# Patient Record
Sex: FEMALE | Race: WHITE | NOT HISPANIC OR LATINO
[De-identification: names, ages, dates, MRNs, and addresses within clinical notes are randomized per-mention and may not be internally consistent; named-entity substitution may affect disease eponyms.]

---

## 2018-04-25 ENCOUNTER — APPOINTMENT (OUTPATIENT)
Dept: CT IMAGING | Facility: HOSPITAL | Age: 73
End: 2018-04-25

## 2018-04-25 ENCOUNTER — OUTPATIENT (OUTPATIENT)
Dept: OUTPATIENT SERVICES | Facility: HOSPITAL | Age: 73
LOS: 1 days | End: 2018-04-25
Payer: MEDICARE

## 2018-04-25 DIAGNOSIS — Z00.8 ENCOUNTER FOR OTHER GENERAL EXAMINATION: ICD-10-CM

## 2018-04-25 PROBLEM — Z00.00 ENCOUNTER FOR PREVENTIVE HEALTH EXAMINATION: Status: ACTIVE | Noted: 2018-04-25

## 2018-04-25 PROCEDURE — 71250 CT THORAX DX C-: CPT

## 2018-04-25 PROCEDURE — 71250 CT THORAX DX C-: CPT | Mod: 26

## 2018-04-26 ENCOUNTER — INPATIENT (INPATIENT)
Facility: HOSPITAL | Age: 73
LOS: 1 days | Discharge: HOME HEALTH | End: 2018-04-28
Attending: HOSPITALIST | Admitting: HOSPITALIST
Payer: MEDICARE

## 2018-04-26 VITALS
HEART RATE: 113 BPM | RESPIRATION RATE: 22 BRPM | SYSTOLIC BLOOD PRESSURE: 121 MMHG | TEMPERATURE: 98 F | DIASTOLIC BLOOD PRESSURE: 77 MMHG | OXYGEN SATURATION: 94 %

## 2018-04-26 DIAGNOSIS — J90 PLEURAL EFFUSION, NOT ELSEWHERE CLASSIFIED: ICD-10-CM

## 2018-04-26 LAB
ALBUMIN SERPL ELPH-MCNC: 3.2 G/DL — LOW (ref 3.3–5)
ALP SERPL-CCNC: 160 U/L — HIGH (ref 40–120)
ALT FLD-CCNC: 20 U/L — SIGNIFICANT CHANGE UP (ref 4–33)
APTT BLD: 34.5 SEC — SIGNIFICANT CHANGE UP (ref 27.5–37.4)
AST SERPL-CCNC: 21 U/L — SIGNIFICANT CHANGE UP (ref 4–32)
BASE EXCESS BLDV CALC-SCNC: 8.9 MMOL/L — SIGNIFICANT CHANGE UP
BASOPHILS # BLD AUTO: 0.02 K/UL — SIGNIFICANT CHANGE UP (ref 0–0.2)
BASOPHILS NFR BLD AUTO: 0.4 % — SIGNIFICANT CHANGE UP (ref 0–2)
BILIRUB SERPL-MCNC: 0.7 MG/DL — SIGNIFICANT CHANGE UP (ref 0.2–1.2)
BLOOD GAS VENOUS - CREATININE: 0.46 MG/DL — LOW (ref 0.5–1.3)
BUN SERPL-MCNC: 12 MG/DL — SIGNIFICANT CHANGE UP (ref 7–23)
CALCIUM SERPL-MCNC: 9.4 MG/DL — SIGNIFICANT CHANGE UP (ref 8.4–10.5)
CHLORIDE BLDV-SCNC: 100 MMOL/L — SIGNIFICANT CHANGE UP (ref 96–108)
CHLORIDE SERPL-SCNC: 96 MMOL/L — LOW (ref 98–107)
CO2 SERPL-SCNC: 29 MMOL/L — SIGNIFICANT CHANGE UP (ref 22–31)
CREAT SERPL-MCNC: 0.5 MG/DL — SIGNIFICANT CHANGE UP (ref 0.5–1.3)
EOSINOPHIL # BLD AUTO: 0.03 K/UL — SIGNIFICANT CHANGE UP (ref 0–0.5)
EOSINOPHIL NFR BLD AUTO: 0.5 % — SIGNIFICANT CHANGE UP (ref 0–6)
GAS PNL BLDV: 137 MMOL/L — SIGNIFICANT CHANGE UP (ref 136–146)
GLUCOSE BLDV-MCNC: 134 — HIGH (ref 70–99)
GLUCOSE SERPL-MCNC: 130 MG/DL — HIGH (ref 70–99)
HCO3 BLDV-SCNC: 31 MMOL/L — HIGH (ref 20–27)
HCT VFR BLD CALC: 46.6 % — HIGH (ref 34.5–45)
HCT VFR BLDV CALC: 48.1 % — HIGH (ref 34.5–45)
HGB BLD-MCNC: 15.2 G/DL — SIGNIFICANT CHANGE UP (ref 11.5–15.5)
HGB BLDV-MCNC: 15.7 G/DL — HIGH (ref 11.5–15.5)
IMM GRANULOCYTES # BLD AUTO: 0.01 # — SIGNIFICANT CHANGE UP
IMM GRANULOCYTES NFR BLD AUTO: 0.2 % — SIGNIFICANT CHANGE UP (ref 0–1.5)
INR BLD: 1.06 — SIGNIFICANT CHANGE UP (ref 0.88–1.17)
LACTATE BLDV-MCNC: 2 MMOL/L — SIGNIFICANT CHANGE UP (ref 0.5–2)
LYMPHOCYTES # BLD AUTO: 0.51 K/UL — LOW (ref 1–3.3)
LYMPHOCYTES # BLD AUTO: 9 % — LOW (ref 13–44)
MCHC RBC-ENTMCNC: 31.3 PG — SIGNIFICANT CHANGE UP (ref 27–34)
MCHC RBC-ENTMCNC: 32.6 % — SIGNIFICANT CHANGE UP (ref 32–36)
MCV RBC AUTO: 96.1 FL — SIGNIFICANT CHANGE UP (ref 80–100)
MONOCYTES # BLD AUTO: 0.63 K/UL — SIGNIFICANT CHANGE UP (ref 0–0.9)
MONOCYTES NFR BLD AUTO: 11.1 % — SIGNIFICANT CHANGE UP (ref 2–14)
NEUTROPHILS # BLD AUTO: 4.48 K/UL — SIGNIFICANT CHANGE UP (ref 1.8–7.4)
NEUTROPHILS NFR BLD AUTO: 78.8 % — HIGH (ref 43–77)
NRBC # FLD: 0 — SIGNIFICANT CHANGE UP
PCO2 BLDV: 54 MMHG — HIGH (ref 41–51)
PH BLDV: 7.41 PH — SIGNIFICANT CHANGE UP (ref 7.32–7.43)
PLATELET # BLD AUTO: 217 K/UL — SIGNIFICANT CHANGE UP (ref 150–400)
PMV BLD: 10.3 FL — SIGNIFICANT CHANGE UP (ref 7–13)
PO2 BLDV: 41 MMHG — HIGH (ref 35–40)
POTASSIUM BLDV-SCNC: 3.9 MMOL/L — SIGNIFICANT CHANGE UP (ref 3.4–4.5)
POTASSIUM SERPL-MCNC: 4 MMOL/L — SIGNIFICANT CHANGE UP (ref 3.5–5.3)
POTASSIUM SERPL-SCNC: 4 MMOL/L — SIGNIFICANT CHANGE UP (ref 3.5–5.3)
PROT SERPL-MCNC: 7 G/DL — SIGNIFICANT CHANGE UP (ref 6–8.3)
PROTHROM AB SERPL-ACNC: 12.2 SEC — SIGNIFICANT CHANGE UP (ref 9.8–13.1)
RBC # BLD: 4.85 M/UL — SIGNIFICANT CHANGE UP (ref 3.8–5.2)
RBC # FLD: 12.8 % — SIGNIFICANT CHANGE UP (ref 10.3–14.5)
SAO2 % BLDV: 74 % — SIGNIFICANT CHANGE UP (ref 60–85)
SODIUM SERPL-SCNC: 137 MMOL/L — SIGNIFICANT CHANGE UP (ref 135–145)
TROPONIN T SERPL-MCNC: < 0.06 NG/ML — SIGNIFICANT CHANGE UP (ref 0–0.06)
WBC # BLD: 5.68 K/UL — SIGNIFICANT CHANGE UP (ref 3.8–10.5)
WBC # FLD AUTO: 5.68 K/UL — SIGNIFICANT CHANGE UP (ref 3.8–10.5)

## 2018-04-26 PROCEDURE — 71275 CT ANGIOGRAPHY CHEST: CPT | Mod: 26

## 2018-04-26 NOTE — ED ADULT TRIAGE NOTE - CHIEF COMPLAINT QUOTE
Arrives from [facility] because her chest ct shows pleural effusion, sent for eval . H/O lung CA   [ see papers sent from facility  ] Arrives from [facility] because her chest ct shows pleural effusion, sent for eval . H/O lung CA   [ see papers sent from facility  ] Patient appears comfortable at time of triage.

## 2018-04-26 NOTE — ED ADULT NURSE NOTE - OBJECTIVE STATEMENT
patient alert ox3 came in c/o SOB, had a ct chest done yesterday shows increased fluid in pleural space and sent in for drainage. patient is on oxygen since few days. denies cp. h/o lung cancer with mets. CM shows sinus tach. stage 1 decubiti to sacral area noted, non blanchable redness to sacrum, skin intact. labs done as ordered. awaiting results and re eval.

## 2018-04-26 NOTE — ED PROVIDER NOTE - ATTENDING CONTRIBUTION TO CARE
[place]  pt with advanced lung CA with recurrent pleural effusion  tapped in past  sent in due to reaccumulation   Pt w/o acute complaint  nl RR sat 94 RA decreased sounds both lower fields  card RRR S1S2  abd benign  as plain CT was done as outpt  (no read available)  CTA performed here for further eval  pending at time of signover

## 2018-04-26 NOTE — ED PROVIDER NOTE - OBJECTIVE STATEMENT
71 yo female with PMH of metastatic lung cancer on chemo, recurrent left pulmonary effusion, [facility] effusion seen on CT scan today. Patient 94% on RA, 99% on 2 L nasal cannula. Appears comfortable on and off room air. Patient deconditioned at baseline and endorses baseline PADRON and SOB 2/2 lung cancer but denies CP, palpitations, syncope, pre-syncope, diaphoresis, abdominal pain, back pain, n/v.    Oncologist: [facility] Dr. Ernie Arambulaorrjose

## 2018-04-26 NOTE — ED ADULT NURSE NOTE - CHIEF COMPLAINT QUOTE
Arrives from [facility] because her chest ct shows pleural effusion, sent for eval . H/O lung CA   [ see papers sent from facility  ] Patient appears comfortable at time of triage.

## 2018-04-27 DIAGNOSIS — D86.9 SARCOIDOSIS, UNSPECIFIED: ICD-10-CM

## 2018-04-27 DIAGNOSIS — J90 PLEURAL EFFUSION, NOT ELSEWHERE CLASSIFIED: ICD-10-CM

## 2018-04-27 DIAGNOSIS — C34.92 MALIGNANT NEOPLASM OF UNSPECIFIED PART OF LEFT BRONCHUS OR LUNG: ICD-10-CM

## 2018-04-27 DIAGNOSIS — Z29.9 ENCOUNTER FOR PROPHYLACTIC MEASURES, UNSPECIFIED: ICD-10-CM

## 2018-04-27 PROCEDURE — 12345: CPT | Mod: GC,NC

## 2018-04-27 PROCEDURE — 99223 1ST HOSP IP/OBS HIGH 75: CPT | Mod: GC

## 2018-04-27 RX ORDER — OSIMERTINIB 80 1/1
1 TABLET, FILM COATED ORAL
Qty: 0 | Refills: 0 | COMMUNITY

## 2018-04-27 RX ORDER — ZINC SULFATE TAB 220 MG (50 MG ZINC EQUIVALENT) 220 (50 ZN) MG
220 TAB ORAL DAILY
Qty: 0 | Refills: 0 | Status: DISCONTINUED | OUTPATIENT
Start: 2018-04-27 | End: 2018-04-28

## 2018-04-27 RX ORDER — ZINC OXIDE 200 MG/G
2 OINTMENT TOPICAL
Qty: 0 | Refills: 0 | COMMUNITY

## 2018-04-27 RX ORDER — ACETAMINOPHEN 500 MG
2 TABLET ORAL
Qty: 0 | Refills: 0 | COMMUNITY

## 2018-04-27 RX ORDER — ASCORBIC ACID 60 MG
500 TABLET,CHEWABLE ORAL DAILY
Qty: 0 | Refills: 0 | Status: DISCONTINUED | OUTPATIENT
Start: 2018-04-27 | End: 2018-04-28

## 2018-04-27 RX ORDER — FAMOTIDINE 10 MG/ML
1 INJECTION INTRAVENOUS
Qty: 0 | Refills: 0 | COMMUNITY

## 2018-04-27 RX ORDER — NYSTATIN CREAM 100000 [USP'U]/G
1 CREAM TOPICAL
Qty: 0 | Refills: 0 | COMMUNITY

## 2018-04-27 RX ORDER — FOLIC ACID 0.8 MG
1 TABLET ORAL
Qty: 0 | Refills: 0 | COMMUNITY

## 2018-04-27 RX ORDER — TIOTROPIUM BROMIDE 18 UG/1
1 CAPSULE ORAL; RESPIRATORY (INHALATION) DAILY
Qty: 0 | Refills: 0 | Status: DISCONTINUED | OUTPATIENT
Start: 2018-04-27 | End: 2018-04-28

## 2018-04-27 RX ORDER — FOLIC ACID 0.8 MG
1 TABLET ORAL DAILY
Qty: 0 | Refills: 0 | Status: DISCONTINUED | OUTPATIENT
Start: 2018-04-27 | End: 2018-04-28

## 2018-04-27 RX ORDER — NYSTATIN 500MM UNIT
2 POWDER (EA) MISCELLANEOUS
Qty: 0 | Refills: 0 | COMMUNITY

## 2018-04-27 RX ORDER — PREGABALIN 225 MG/1
100 CAPSULE ORAL DAILY
Qty: 0 | Refills: 0 | Status: DISCONTINUED | OUTPATIENT
Start: 2018-04-27 | End: 2018-04-28

## 2018-04-27 RX ORDER — UMECLIDINIUM 62.5 UG/1
1 AEROSOL, POWDER ORAL
Qty: 0 | Refills: 0 | COMMUNITY

## 2018-04-27 RX ORDER — FAMOTIDINE 10 MG/ML
20 INJECTION INTRAVENOUS DAILY
Qty: 0 | Refills: 0 | Status: DISCONTINUED | OUTPATIENT
Start: 2018-04-27 | End: 2018-04-28

## 2018-04-27 RX ORDER — PREGABALIN 225 MG/1
1 CAPSULE ORAL
Qty: 0 | Refills: 0 | COMMUNITY

## 2018-04-27 RX ORDER — ZINC SULFATE TAB 220 MG (50 MG ZINC EQUIVALENT) 220 (50 ZN) MG
1 TAB ORAL
Qty: 0 | Refills: 0 | COMMUNITY

## 2018-04-27 RX ORDER — FUROSEMIDE 40 MG
20 TABLET ORAL DAILY
Qty: 0 | Refills: 0 | Status: DISCONTINUED | OUTPATIENT
Start: 2018-04-27 | End: 2018-04-28

## 2018-04-27 RX ORDER — ASCORBIC ACID 60 MG
1 TABLET,CHEWABLE ORAL
Qty: 0 | Refills: 0 | COMMUNITY

## 2018-04-27 RX ADMIN — FAMOTIDINE 20 MILLIGRAM(S): 10 INJECTION INTRAVENOUS at 17:46

## 2018-04-27 RX ADMIN — PREGABALIN 100 MICROGRAM(S): 225 CAPSULE ORAL at 17:48

## 2018-04-27 RX ADMIN — Medication 500 MILLIGRAM(S): at 17:46

## 2018-04-27 RX ADMIN — Medication 20 MILLIGRAM(S): at 17:46

## 2018-04-27 RX ADMIN — Medication 1 TABLET(S): at 17:46

## 2018-04-27 RX ADMIN — Medication 10 MILLIGRAM(S): at 17:46

## 2018-04-27 RX ADMIN — Medication 1 APPLICATION(S): at 17:38

## 2018-04-27 RX ADMIN — Medication 1 MILLIGRAM(S): at 17:46

## 2018-04-27 NOTE — CONSULT NOTE ADULT - ASSESSMENT
72 F with extensive stage SCLC (dx 2014, currently on tagrisso), sarcoidosis (she is unsure how she was diagnosed, but is on steroids for it, she thinks), h/o left pleural effusion s/p thoracentesis at [facility] in March, sent from [facility] for "a large right pleural effusion," found to have a small right pleural effusion    1) pleural effusion  - small effusion on bedside lung ultrasound on the right, very small on left; she is asymptomatic from it, so there is no need to drain this at this time  - pt should follow up with her oncologist at [place] re: these CT scan results to compare this scan with prior scans, and with regards to further treatment options    d/w primary team      --    Alex Fish MD  Pulmonary & Critical Care Medicine Fellow | PGY-6  [phone]

## 2018-04-27 NOTE — PROGRESS NOTE ADULT - SUBJECTIVE AND OBJECTIVE BOX
CHUNG Team 1 Acceptance Note- PGY1.    Contact Info:  Seamus Key M.D., PGY-1  Pager: NS- [phone], CHUNG- [id number]      HPI:  Patient is a 72 year-old female with PMH of metastatic small cell lung cancer (dx 2014,  on tagrisso), recurrent left pulmonary effusion referred from [facility] for worsening SOB. Outpatient CT scan showed a large L pleural effusion. Patient states she has had recurrent pleural effusion on R. with a prior catheter which has now been removed. Patient is deconditioned at baseline and endorses baseline PADRON and SOB. On presentation, patient saturating 94% on RA and 100% on 2L NC. She denies chest pain, palpitations, syncope, pre-syncope, diaphoresis, abdominal pain, back pain, n/v, sick contact, recent travel.    ED course: T 98.2, HR 97, 105/71, RR 18, SPO2=97% on 2L NC. (2018 06:12)      Seen and examine patient at bedside. Patient reports feeling . Denied fever, chill, n/v/d, CP, SOB, or abdominal pain.       Allergies    No Known Allergies    Intolerances    	    PAST MEDICAL & SURGICAL HISTORY:  Sarcoidosis  Lung cancer  No significant past surgical history      FAMILY HISTORY:  Family history of thyroid cancer (Mother)  Family history of muscular dystrophy (Father)  Family history of Kaposi's sarcoma (Sibling)      SOCIAL HISTORY:  Tobacco: denies  EtOH: denies  Illicit drugs: denies  Lives with    MEDICATIONS:  furosemide    Tablet 20 milliGRAM(s) Oral daily      benzonatate 100 milliGRAM(s) Oral three times a day PRN  tiotropium 18 MICROgram(s) Capsule 1 Capsule(s) Inhalation daily      famotidine    Tablet 20 milliGRAM(s) Oral daily    predniSONE   Tablet 10 milliGRAM(s) Oral daily    ascorbic acid 500 milliGRAM(s) Oral daily  clotrimazole 1% Cream 1 Application(s) Topical two times a day  cyanocobalamin 100 MICROGram(s) Oral daily  folic acid 1 milliGRAM(s) Oral daily  multivitamin 1 Tablet(s) Oral daily  zinc sulfate 220 milliGRAM(s) Oral daily      PHYSICAL EXAM:  T(C): 36.8 (18 @ 05:01), Max: 37.7 (18 @ 17:15)  HR: 97 (18 @ 05:01) (96 - 113)  BP: 105/71 (18 @ 05:01) (105/71 - 131/89)  RR: 18 (18 @ 05:01) (18 - 24)  SpO2: 97% (18 @ 05:01) (94% - 97%)  Wt(kg): --  Daily Height in cm: 162.56 (2018 01:33)    Daily Weight in k.4 (2018 05:01)  I&O's Summary      TELEMETRY:     Daily Height in cm: 162.56 (2018 01:33)    Daily Weight in k.4 (2018 05:01)   Appearance: NAD, well-developed	  HEENT:   Normal oral mucosa, PERRL, EOMI	  Neck: No JVD, no LAD, supple, trachea in midline  Cardiovascular: Normal S1 S2, No JVD, No murmurs  Respiratory: Lungs clear to auscultation, no wheezing, rhonchi  Gastrointestinal:  Soft, Non-tender, nondistended, + BS  Skin: No rashes, No ecchymoses, No cyanosis	  MSK/Extremities: Normal range of motion, 5/5 Muscle strength bilaterally. No clubbing, cyanosis or edema  Vascular: Peripheral pulses palpable 2+ bilaterally  Neurologic: Non-focal, CN II-XII intact  Psychiatry: A & O x 3, Mood & affect appropriate    LABS:	 	                        15.2   5.68  )-----------( 217      ( 2018 17:24 )             46.6         137  |  96<L>  |  12  ----------------------------<  130<H>  4.0   |  29  |  0.50    Ca    9.4      2018 17:24    TPro  7.0  /  Alb  3.2<L>  /  TBili  0.7  /  DBili  x   /  AST  21  /  ALT  20  /  AlkPhos  160<H>        proBNP:   Lipid Profile:   HgA1c:   TSH:   FS: CAPILLARY BLOOD GLUCOSE        BCX/UCX:     CARDIAC MARKERS:       COAGS:  INR: 1.06 (18 @ 17:24)    	    ECG:  	  RADIOLOGY:    Imaging Personally Reviewed:  [ ] YES  [ ] NO  Consultant(s) Notes Reviewed:  [X] YES  [ ] NO  Care Discussed with Consultants/Other Providers [ ] YES  [ ] NO CHUNG Team 1 Acceptance Note- PGY1.    Contact Info:  Seamus Key M.D., PGY-1  Pager: NS- [phone], CHUNG- [id number]      HPI:  Patient is a 72 year-old female with PMH of metastatic small cell lung cancer (dx ,  on tagrisso), recurrent left pulmonary effusion referred from [facility] for worsening SOB. Outpatient CT scan showed a large L pleural effusion. Patient states she has had recurrent pleural effusion on R. with a prior catheter which has now been removed. Patient is deconditioned at baseline and endorses baseline PADRON and SOB. On presentation, patient saturating 94% on RA and 100% on 2L NC. She denies chest pain, palpitations, syncope, pre-syncope, diaphoresis, abdominal pain, back pain, n/v, sick contact, recent travel.  ED course: T 98.2, HR 97, 105/71, RR 18, SPO2=97% on 2L NC. (2018 06:12)    Seen and examine patient at bedside. Patient reports feeling good. Denied fever, chill, CP, SOB, abdominal pain, or diarrhea.       Allergies    No Known Allergies    Intolerances    	    PAST MEDICAL & SURGICAL HISTORY:  Sarcoidosis  Lung cancer  No significant past surgical history      FAMILY HISTORY:  Family history of thyroid cancer (Mother)  Family history of muscular dystrophy (Father)  Family history of Kaposi's sarcoma (Sibling)      SOCIAL HISTORY:  Tobacco: denies  EtOH: denies  Illicit drugs: denies  Lives with    MEDICATIONS:  furosemide    Tablet 20 milliGRAM(s) Oral daily      benzonatate 100 milliGRAM(s) Oral three times a day PRN  tiotropium 18 MICROgram(s) Capsule 1 Capsule(s) Inhalation daily      famotidine    Tablet 20 milliGRAM(s) Oral daily    predniSONE   Tablet 10 milliGRAM(s) Oral daily    ascorbic acid 500 milliGRAM(s) Oral daily  clotrimazole 1% Cream 1 Application(s) Topical two times a day  cyanocobalamin 100 MICROGram(s) Oral daily  folic acid 1 milliGRAM(s) Oral daily  multivitamin 1 Tablet(s) Oral daily  zinc sulfate 220 milliGRAM(s) Oral daily      PHYSICAL EXAM:  T(C): 36.8 (18 @ 05:01), Max: 37.7 (18 @ 17:15)  HR: 97 (18 @ 05:01) (96 - 113)  BP: 105/71 (18 @ 05:01) (105/71 - 131/89)  RR: 18 (18 @ 05:01) (18 - 24)  SpO2: 97% (18 @ 05:01) (94% - 97%)  Wt(kg): --  Daily Height in cm: 162.56 (2018 01:33)    Daily Weight in k.4 (2018 05:01)  I&O's Summary      TELEMETRY:     Daily Height in cm: 162.56 (2018 01:33)    Daily Weight in k.4 (2018 05:01)   General: WN/WD NAD  Neurology: A&Ox3, nonfocal, WHITE x 4  Head:  Normocephalic, atraumatic  ENT:  Mucosa moist, no ulcerations  Neck:  Supple, no sinuses or palpable masses  Lymphatic:  No palpable cervical, supraclavicular, axillary or inguinal adenopathy  Respiratory: decreased BS R>L  CV: RRR, S1S2, no murmur  Abdominal: Soft, NT, ND no palpable mass  MSK: No edema, + peripheral pulses, FROM all 4 extremity    LABS:	 	                        15.2   5.68  )-----------( 217      ( 2018 17:24 )             46.6         137  |  96<L>  |  12  ----------------------------<  130<H>  4.0   |  29  |  0.50    Ca    9.4      2018 17:24    TPro  7.0  /  Alb  3.2<L>  /  TBili  0.7  /  DBili  x   /  AST  21  /  ALT  20  /  AlkPhos  160<H>        proBNP:   Lipid Profile:   HgA1c:   TSH:   FS: CAPILLARY BLOOD GLUCOSE      RADIOLOGY:  < from: CT Angio Chest w/ IV Cont (18 @ 20:45) >  IMPRESSION:     Suboptimal contrast opacification of the subsegmental pulmonary arteries.   No obvious embolus to the level of the segmentalpulmonary arteries.     Narrowing of the left-sided bronchi and left-sided segmental pulmonary   arteries with left hilar/basilar opacity, which may be related to known   neoplasm. Recommend comparison to previous outside study for further   evaluation.    Small to moderate right and small left pleural effusion with compressive   atelectasis. Recommend clinical correlation to assess underlying   pneumonia.    Indeterminate hypodense lesion along the left chest wall.   Neoplasm/metastasis is not excluded. Comparison to previous outside study   would be helpful.    Coarse calcifications in the right > left breast soft tissue. Recommend   correlation with dedicated breast imaging.    Osseous metastasis. Age-indeterminate endplate depression in the spine as   described. Comparison to previous outside study is recommended to assess   stability/chronicity.     Additional findings as described.    < end of copied text >        Imaging Personally Reviewed:  [ ] YES  [ ] NO  Consultant(s) Notes Reviewed:  [X] YES  [ ] NO  Care Discussed with Consultants/Other Providers [ ] YES  [ ] NO

## 2018-04-27 NOTE — DISCHARGE NOTE ADULT - CARE PLAN
Principal Discharge DX:	Pleural effusion  Goal:	Resolution  Assessment and plan of treatment:	You came in with imaging finding of fluid collection in your lungs. This is caused by your lung cancer. You were seen by pulmonologist and recommended no drainage due to insufficient fluid to be drained. Please follow up with Dr. Estrella for further treatment options  Secondary Diagnosis:	Sarcoidosis  Goal:	Continue treatment, follow up with Dr. Estrella  Assessment and plan of treatment:	Please continue to take prednisone at home. Please follow up with Dr. Estrella in 1-2 weeks  Secondary Diagnosis:	Lung cancer  Goal:	follow up with Dr. Estrella  Assessment and plan of treatment:	Please follow up with Dr. Estrella in 1-2 weeks after discharge from the hospital. Principal Discharge DX:	Pleural effusion  Goal:	Resolution  Assessment and plan of treatment:	You came in with imaging finding of fluid collection in your lungs. This is caused by your lung cancer. You were seen by pulmonologist and recommended no drainage due to insufficient fluid to be drained. Please continue to take furosemide 20mg daily. Please follow up with Dr. Estrella for further treatment options  Secondary Diagnosis:	Sarcoidosis  Goal:	Continue treatment, follow up with Dr. Estrella  Assessment and plan of treatment:	Please continue to take prednisone at home. Please follow up with Dr. Estrella in 1-2 weeks  Secondary Diagnosis:	Lung cancer  Goal:	follow up with Dr. Estrella  Assessment and plan of treatment:	Please continue to take tagrisso at home. Please follow up with Dr. Estrella in 1-2 weeks after discharge from the hospital.

## 2018-04-27 NOTE — DISCHARGE NOTE ADULT - DURABLE MEDICAL EQUIPMENT AGENCY
Rolling walker ordered through [facility] [place] [phone], for delivery to hospital bedside, awaiting insurance authorization. Patient declined need for Rolling walker.

## 2018-04-27 NOTE — PROGRESS NOTE ADULT - PROBLEM SELECTOR PLAN 2
-Oncology consult to restart tigrasso  -Monitor calcium and electrolytes  - c/w inhaler therapy (change to tiotropium as incruse not on formulary), lasix, MVI supplementation

## 2018-04-27 NOTE — H&P ADULT - NSHPPHYSICALEXAM_GEN_ALL_CORE
Vital Signs Last 24 Hrs  T(C): 36.8 (27 Apr 2018 05:01), Max: 37.7 (26 Apr 2018 17:15)  T(F): 98.2 (27 Apr 2018 05:01), Max: 99.9 (26 Apr 2018 17:15)  HR: 97 (27 Apr 2018 05:01) (96 - 113)  BP: 105/71 (27 Apr 2018 05:01) (105/71 - 131/89)  BP(mean): --  RR: 18 (27 Apr 2018 05:01) (18 - 24)  SpO2: 97% (27 Apr 2018 05:01) (94% - 97%)    General: WN/WD NAD  Neurology: A&Ox3, nonfocal, WHITE x 4  Head:  Normocephalic, atraumatic  ENT:  Mucosa moist, no ulcerations  Neck:  Supple, no sinuses or palpable masses  Lymphatic:  No palpable cervical, supraclavicular, axillary or inguinal adenopathy  Respiratory: decreased BS R>L, moving care  CV: RRR, S1S2, no murmur  Abdominal: Soft, NT, ND no palpable mass  MSK: No edema, + peripheral pulses, FROM all 4 extremity Vital Signs Last 24 Hrs  T(C): 36.8 (27 Apr 2018 05:01), Max: 37.7 (26 Apr 2018 17:15)  T(F): 98.2 (27 Apr 2018 05:01), Max: 99.9 (26 Apr 2018 17:15)  HR: 97 (27 Apr 2018 05:01) (96 - 113)  BP: 105/71 (27 Apr 2018 05:01) (105/71 - 131/89)  BP(mean): --  RR: 18 (27 Apr 2018 05:01) (18 - 24)  SpO2: 97% (27 Apr 2018 05:01) (94% - 97%)    General: WN/WD NAD  Neurology: A&Ox3, nonfocal, WHITE x 4  Head:  Normocephalic, atraumatic  ENT:  Mucosa moist, no ulcerations  Neck:  Supple, no sinuses or palpable masses  Lymphatic:  No palpable cervical, supraclavicular, axillary or inguinal adenopathy  Respiratory: decreased BS R>L  CV: RRR, S1S2, no murmur  Abdominal: Soft, NT, ND no palpable mass  MSK: No edema, + peripheral pulses, FROM all 4 extremity

## 2018-04-27 NOTE — PHYSICAL THERAPY INITIAL EVALUATION ADULT - PERTINENT HX OF CURRENT PROBLEM, REHAB EVAL
Patient is a 72 year old female admitted to [facility] on 4/[facility] for worsening SOB. PMH includes: metastatic small cell lung cancer, recurrent left pulmonary effusion.

## 2018-04-27 NOTE — H&P ADULT - ATTENDING COMMENTS
Patient seen and examined on 4/27/18, case discussed with Dr. Cade, agree with assessment and plan as above.

## 2018-04-27 NOTE — PROGRESS NOTE ADULT - ASSESSMENT
Patient is a 72 year-old female with PMH of metastatic small cell lung cancer (dx 2014,  on tagrisso), hx of recurrent pleural effusion referred from [facility] for imaging finding of right pleural effusion.

## 2018-04-27 NOTE — H&P ADULT - HISTORY OF PRESENT ILLNESS
Patient is a 72 year-old female with PMH of metastatic small cell lung cancer (dx 2014,  on tagrisso), recurrent left pulmonary effusion referred from [facility] for worsening SOB. Outpatient CT scan showed a large L pleural effusion. Patient states she has had recurrent pleural effusion on R. with a prior catheter which has now been removed. Patient is deconditioned at baseline and endorses baseline PADRON and SOB. On presentation, patient saturating 94% on RA and 100% on 2L NC. She denies chest pain, palpitations, syncope, pre-syncope, diaphoresis, abdominal pain, back pain, n/v, sick contact, recent travel.    ED course: T 98.2, HR 97, 105/71, RR 18, SPO2=97% on 2L NC.

## 2018-04-27 NOTE — H&P ADULT - PROBLEM SELECTOR PLAN 2
-Oncology follow up.   -Will need to resume tigrasso -Oncology consult.   -Will need to resume tigrasso  -Monitor calcium and electrolytes -Oncology consult.   -Will need to resume tigrasso  -Monitor calcium and electrolytes  - c/w inhaler therapy (change to tiotropium as incruse not on formulary), lasix, MVI supplementation

## 2018-04-27 NOTE — H&P ADULT - PROBLEM SELECTOR PLAN 1
-Small to moderate right >small left pleural effusion. Indeterminate hypodense lesion along the left chest wall. Neoplasm/metastasis is not excluded.   would be helpful  -Pulmonology consult in AM for thoracentesis vs pleurex catheter placement  -Obtain outpatient records from [facility] -Small to moderate right >small left pleural effusion. Indeterminate hypodense lesion along the left chest wall. Neoplasm/metastasis is not excluded.   would be helpful  -Pulmonology consult in AM for pleurex catheter placement  -Obtain outpatient records from [facility] -Small to moderate right >small left pleural effusion. Indeterminate hypodense lesion along the left chest wall. Neoplasm/metastasis is not excluded.   would be helpful  - Patient was recently hospitalized as [facility] in [place] for similar finding and had a pleural tap which revealed her to have a malignant R sided effusion, therefore suspect her current effusion is likely a reaccumulation of her malignant effusion  -Pulmonology consult in AM for possible pleurex catheter placement  -Obtain outpatient records from [facility]

## 2018-04-27 NOTE — DISCHARGE NOTE ADULT - PROVIDER TOKENS
FREE:[LAST:[Delores],FIRST:[Ernie],PHONE:[phone]],FAX:[(   [phone]    -],ADDRESS:[[address] [place] [address],   [place] [address]]]

## 2018-04-27 NOTE — DISCHARGE NOTE ADULT - PLAN OF CARE
Continue treatment, follow up with Dr. Estrella Please continue to take prednisone at home. Please follow up with Dr. Estrella in 1-2 weeks follow up with Dr. Estrella Please follow up with Dr. Estrella in 1-2 weeks after discharge from the hospital. Resolution You came in with imaging finding of fluid collection in your lungs. This is caused by your lung cancer. You were seen by pulmonologist and recommended no drainage due to insufficient fluid to be drained. Please follow up with Dr. Estrella for further treatment options You came in with imaging finding of fluid collection in your lungs. This is caused by your lung cancer. You were seen by pulmonologist and recommended no drainage due to insufficient fluid to be drained. Please continue to take furosemide 20mg daily. Please follow up with Dr. Estrella for further treatment options Please continue to take tagrisso at home. Please follow up with Dr. Estrella in 1-2 weeks after discharge from the hospital.

## 2018-04-27 NOTE — DISCHARGE NOTE ADULT - HOME CARE AGENCY
[facility] [place]. [phone]. Nurse to visit on day after discharge. Nurse to call prior to visit to arrange visit. Physical Therapy to follow.

## 2018-04-27 NOTE — DISCHARGE NOTE ADULT - MEDICATION SUMMARY - MEDICATIONS TO TAKE
I will START or STAY ON the medications listed below when I get home from the hospital:    predniSONE 10 mg oral tablet  -- 1 tab(s) by mouth once a day  -- Indication: For Sarcoidosis    Tylenol 325 mg oral tablet  -- 2 tab(s) by mouth every 4 hours, As Needed  -- Indication: For Pain    nystatin 100,000 units/g topical cream (obsolete)  -- Apply on skin to affected area 2 times a day  -- Indication: For rash    Tessalon 200 mg oral capsule  -- 1 cap(s) by mouth 3 times a day  -- Indication: For cough    Incruse Ellipta 62.5 mcg/inh inhalation powder  -- 1 puff(s) inhaled once a day  -- Indication: For Sarcoidosis    nystatin 100,000 units/g topical powder  -- Apply on skin to affected area 2 times a day to b/l feet  -- Indication: For rash    Triple Paste topical ointment  -- Apply on skin to affected area once a day  -- Indication: For rash    Tagrisso 80 mg oral tablet  -- 1 tab(s) by mouth once a day  -- Indication: For Malignant neoplasm of left lung, unspecified part of lung    famotidine 20 mg oral tablet  -- 1 tab(s) by mouth once a day  -- Indication: For GERD    zinc sulfate 220 mg oral tablet  -- 1 tab(s) by mouth once a day  -- Indication: For Nutrition supplement    Multiple Vitamins oral capsule  -- 1 cap(s) by mouth once a day  -- Indication: For Nutrition supplement    cyanocobalamin 100 mcg oral tablet  -- 1 tab(s) by mouth once a day  -- Indication: For Nutrition supplement    folic acid 1 mg oral tablet  -- 1 tab(s) by mouth once a day  -- Indication: For Nutrition supplement    Vitamin C 500 mg oral tablet  -- 1 tab(s) by mouth once a day  -- Indication: For Nutrition supplement I will START or STAY ON the medications listed below when I get home from the hospital:    predniSONE 10 mg oral tablet  -- 1 tab(s) by mouth once a day  -- Indication: For Sarcoidosis    Tylenol 325 mg oral tablet  -- 2 tab(s) by mouth every 4 hours, As Needed  -- Indication: For Pain    nystatin 100,000 units/g topical cream (obsolete)  -- Apply on skin to affected area 2 times a day  -- Indication: For rash    Tessalon 200 mg oral capsule  -- 1 cap(s) by mouth 3 times a day  -- Indication: For cough    Incruse Ellipta 62.5 mcg/inh inhalation powder  -- 1 puff(s) inhaled once a day  -- Indication: For Sarcoidosis    nystatin 100,000 units/g topical powder  -- Apply on skin to affected area 2 times a day to b/l feet  -- Indication: For rash    Triple Paste topical ointment  -- Apply on skin to affected area once a day  -- Indication: For rash    furosemide 20 mg oral tablet  -- 1 tab(s) by mouth once a day  -- Indication: For Pleural effusion    Tagrisso 80 mg oral tablet  -- 1 tab(s) by mouth once a day  -- Indication: For Malignant neoplasm of left lung, unspecified part of lung    famotidine 20 mg oral tablet  -- 1 tab(s) by mouth once a day  -- Indication: For GERD    zinc sulfate 220 mg oral tablet  -- 1 tab(s) by mouth once a day  -- Indication: For Nutrition supplement    Multiple Vitamins oral capsule  -- 1 cap(s) by mouth once a day  -- Indication: For Nutrition supplement    cyanocobalamin 100 mcg oral tablet  -- 1 tab(s) by mouth once a day  -- Indication: For Nutrition supplement    folic acid 1 mg oral tablet  -- 1 tab(s) by mouth once a day  -- Indication: For Nutrition supplement    Vitamin C 500 mg oral tablet  -- 1 tab(s) by mouth once a day  -- Indication: For Nutrition supplement

## 2018-04-27 NOTE — H&P ADULT - PROBLEM SELECTOR PLAN 4
Holding pharmacologic DVT PPX before possible procedure, pnnematic compressions Holding pharmacologic DVT PPX before possible procedure, pneumatic compressions

## 2018-04-27 NOTE — H&P ADULT - NSHPLABSRESULTS_GEN_ALL_CORE
15.2   5.68  )-----------( 217      ( 26 Apr 2018 17:24 )             46.6       04-26    137  |  96<L>  |  12  ----------------------------<  130<H>  4.0   |  29  |  0.50    Ca    9.4      26 Apr 2018 17:24    TPro  7.0  /  Alb  3.2<L>  /  TBili  0.7  /  DBili  x   /  AST  21  /  ALT  20  /  AlkPhos  160<H>  04-26                  PT/INR - ( 26 Apr 2018 17:24 )   PT: 12.2 SEC;   INR: 1.06          PTT - ( 26 Apr 2018 17:24 )  PTT:34.5 SEC    Lactate Trend      CARDIAC MARKERS ( 26 Apr 2018 17:24 )  x     / < 0.06 ng/mL / x     / x     / x            CAPILLARY BLOOD GLUCOSE LABS and ADDITIONAL STUDIES:                15.2   5.68  )-----------( 217      ( 26 Apr 2018 17:24 )              46.6       04-26    137  |  96<L>  |  12  ----------------------------<  130<H>  4.0   |  29  |  0.50    Ca    9.4      26 Apr 2018 17:24    TPro  7.0  /  Alb  3.2<L>  /  TBili  0.7  /  DBili  x   /  AST  21  /  ALT  20  /  AlkPhos  160<H>  04-26        PT/INR - ( 26 Apr 2018 17:24 )   PT: 12.2 SEC;   INR: 1.06     PTT - ( 26 Apr 2018 17:24 )  PTT:34.5 SEC    CARDIAC MARKERS ( 26 Apr 2018 17:24 )  x     / < 0.06 ng/mL / x     / x     / x          < from: CT Angio Chest w/ IV Cont (04.26.18 @ 20:45) >    LUNGS AND LARGE AIRWAYS: Patent central airways. Compressive atelectasis. Narrowing of the left upper lobe, left lower lobe and lingula bronchi with left hilar/basilar opacity. Calcified granuloma in the right lower lobe.  PLEURA: No pneumothorax. Small to moderate right and small left pleural effusion. Hyperdensity along the left pleura, which may be due to prior pleurodesis.  VESSELS: Suboptimal contrast opacification of the subsegmental pulmonary arteries. No obvious embolus to the level of the segmental pulmonary arteries. Atherosclerotic change of the thoracic aorta, great vessel origin and coronary arteries. Somewhat narrowed segmental pulmonary arteries of the left upper lobe, left lower lobe and lingula.  HEART: Normal heart size. No pericardial effusion. Aortic valve calcification.  MEDIASTINUM AND BHUPINDER: Subcentimeter mediastinal and right hilar lymph nodes.  CHEST WALL AND LOWER NECK: Coarse calcifications in the right > left breast soft tissue. A 2.3 x 2.1 cm hyperdense lesion along the left chest wall soft tissue at the level of the left eighth and ninth lateral ribs.  VISUALIZED UPPER ABDOMEN: Small hiatal hernia. Nonspecific bilateral perinephric stranding.  BONES: Multiple sclerotic lesions scattered throughout the visualized bones, indicating metastasis. Rightward curvature and degenerative changes of the spine. Age-indeterminate endplate depression in the spine, notable at T2, T3, T5 and T10-T12. Age-indeterminate contour deformities of the left second and fourth anterior ribs and right third through fifth anterior ribs, which may be chronic.    IMPRESSION:     Suboptimal contrast opacification of the subsegmental pulmonary arteries. No obvious embolus to the level of the segmental pulmonary arteries.     Narrowing of the left-sided bronchi and left-sided segmental pulmonary arteries with left hilar/basilar opacity, which may be related to known neoplasm. Recommend comparison to previous outside study for further evaluation.    Small to moderate right and small left pleural effusion with compressive atelectasis. Recommend clinical correlation to assess underlying pneumonia.    Indeterminate hypodense lesion along the left chest wall. Neoplasm/metastasis is not excluded. Comparison to previous outside study would be helpful.    Coarse calcifications in the right > left breast soft tissue. Recommend correlation with dedicated breast imaging.    Osseous metastasis. Age-indeterminate endplate depression in the spine as described. Comparison to previous outside study is recommended to assess stability/chronicity.     Additional findings as described.    < end of copied text >

## 2018-04-27 NOTE — H&P ADULT - ASSESSMENT
Patient is a 72 year-old female with PMH of metastatic small cell lung cancer (dx 2014,  on tagrisso), recurrent left pulmonary effusion referred from [facility] rehabilitation for worsening SOB.

## 2018-04-27 NOTE — CONSULT NOTE ADULT - ATTENDING COMMENTS
I have seen and examined the patient. I agree with the above history, physical exam, and plan of care except for as detailed below.    73 y/o F w/metastatic SCLC on chemo, sarcoidosis on prednisone, sent from rehab for R pleural effusion found on imaging? Patient cannot give clear reasoning for hospitalization. Effusion is likely malignant. Patient is currently asymptomatic from pleural effusion, not hypoxemic and is afebrile without concern for infected pleural space. On bedside ultrasound R sided effusion is small.     - No indication for thoracentesis/pleurX placement  - Continue lasix  - Continue prednisone   - Outpatient oncology follow up

## 2018-04-27 NOTE — PHYSICAL THERAPY INITIAL EVALUATION ADULT - ADDITIONAL COMMENTS
Patient reports she lives alone in a private house, 1 step to enter. Patient reports 1 flight within the house, but reports she can stay on the main level upon returning home. Patient reports she was previously independent in all ADLs and did not use an assistive device for ambulation.    Patient was left semi-supine in bed as found, all lines/tubes intact and call weller within reach, SHABANA burrows

## 2018-04-27 NOTE — CHART NOTE - NSCHARTNOTEFT_GEN_A_CORE
Oncology Note     Her oncologist is at [facility] [place], Dr Cali. We will differ the decision of restarting tagrisso to her primary oncologist.     CAse d/w Dr Cavanaugh, patient and primary team     Angie Sevilla   Oncology fellow

## 2018-04-27 NOTE — H&P ADULT - FAMILY HISTORY
Sibling  Still living? Unknown  Family history of Kaposi's sarcoma, Age at diagnosis: Age Unknown     Father  Still living? Unknown  Family history of muscular dystrophy, Age at diagnosis: Age Unknown     Mother  Still living? Unknown  Family history of thyroid cancer, Age at diagnosis: Age Unknown

## 2018-04-27 NOTE — DISCHARGE NOTE ADULT - HOSPITAL COURSE
History of Present Illness: 	  Patient is a 72 year-old female with PMH of metastatic small cell lung cancer (dx 2014,  on tagrisso), recurrent left pulmonary effusion referred from [facility] for worsening SOB. Outpatient CT scan showed a large L pleural effusion. Patient states she has had recurrent pleural effusion on R. with a prior catheter which has now been removed. Patient is deconditioned at baseline and endorses baseline PADRON and SOB. On presentation, patient saturating 94% on RA and 100% on 2L NC. She denies chest pain, palpitations, syncope, pre-syncope, diaphoresis, abdominal pain, back pain, n/v, sick contact, recent travel.  ED course: T 98.2, HR 97, 105/71, RR 18, SPO2=97% on 2L NC.     Hospital Course:  Patient was seen by pulmonologist and no indication for thoracentesis or pleurx as patient is asymptomatic and the effusion is small. Patient was seen by the physical therapy which recommended. Patient is deemed medically stable to be dsicharged, History of Present Illness: 	  Patient is a 72 year-old female with PMH of metastatic small cell lung cancer (dx 2014,  on tagrisso), recurrent left pulmonary effusion referred from [facility] for worsening SOB. Outpatient CT scan showed a large L pleural effusion. Patient states she has had recurrent pleural effusion on R. with a prior catheter which has now been removed. Patient is deconditioned at baseline and endorses baseline PADRON and SOB. On presentation, patient saturating 94% on RA and 100% on 2L NC. She denies chest pain, palpitations, syncope, pre-syncope, diaphoresis, abdominal pain, back pain, n/v, sick contact, recent travel.  ED course: T 98.2, HR 97, 105/71, RR 18, SPO2=97% on 2L NC.     Hospital Course:  Patient was seen by pulmonologist and no indication for thoracentesis or pleurx as patient is asymptomatic and the effusion is small. Patient was seen by the physical therapy which recommended home with home PT. Patient is deemed medically stable to be discharged History of Present Illness: 	  Patient is a 72 year-old female with PMH of metastatic small cell lung cancer (dx 2014,  on tagrisso), recurrent left pulmonary effusion referred from [facility] for worsening SOB. Outpatient CT scan showed a large L pleural effusion. Patient states she has had recurrent pleural effusion on R. with a prior catheter which has now been removed. Patient is deconditioned at baseline and endorses baseline PADRON and SOB. On presentation, patient saturating 94% on RA and 100% on 2L NC. She denies chest pain, palpitations, syncope, pre-syncope, diaphoresis, abdominal pain, back pain, n/v, sick contact, recent travel.  ED course: T 98.2, HR 97, 105/71, RR 18, SPO2=97% on 2L NC.     Hospital Course:  Patient was seen by pulmonologist and no indication for thoracentesis or pleurx as patient is asymptomatic and the effusion is small. Patient was seen by the physical therapy which recommended home with home PT. Patient is deemed medically stable to be discharged home. History of Present Illness: 	  Patient is a 72 year-old female with PMH of metastatic small cell lung cancer (dx 2014,  on tagrisso), recurrent left pulmonary effusion referred from [facility] for worsening SOB. Outpatient CT scan showed a large L pleural effusion. Patient states she has had recurrent pleural effusion on R. with a prior catheter which has now been removed. Patient is deconditioned at baseline and endorses baseline PADRON and SOB. On presentation, patient saturating 94% on RA and 100% on 2L NC. She denies chest pain, palpitations, syncope, pre-syncope, diaphoresis, abdominal pain, back pain, n/v, sick contact, recent travel.  ED course: T 98.2, HR 97, 105/71, RR 18, SPO2=97% on 2L NC.     Hospital Course:  Patient was seen by pulmonologist and no indication for thoracentesis or pleurx as patient is asymptomatic and the effusion is small. Patient was seen by the physical therapy which recommended home with home PT. Patient is deemed medically stable to be discharged home. Patient's records of CTA chest was faxed over to Dr. Estrella's office. History of Present Illness: 	  Patient is a 72 year-old female with PMH of metastatic small cell lung cancer (dx 2014,  on tagrisso), recurrent left pulmonary effusion referred from [facility] for worsening SOB. Outpatient CT scan showed a large L pleural effusion. Patient states she has had recurrent pleural effusion on R. with a prior catheter which has now been removed. Patient is deconditioned at baseline and endorses baseline PADRON and SOB. On presentation, patient saturating 94% on RA and 100% on 2L NC. She denies chest pain, palpitations, syncope, pre-syncope, diaphoresis, abdominal pain, back pain, n/v, sick contact, recent travel.  ED course: T 98.2, HR 97, 105/71, RR 18, SPO2=97% on 2L NC.     Hospital Course:  Patient was seen by pulmonologist and no indication for thoracentesis or pleurx as patient is asymptomatic and the effusion is small. Patient was seen by the physical therapy which recommended home with home PT. Patient is deemed medically stable to be discharged home. Patient's records of CTA chest was given to the patient with patient's consent to release medical record on file. Advised patient to bring the imaging result to Dr. Estrella for further follow up which patient understood and agreed.

## 2018-04-27 NOTE — PROGRESS NOTE ADULT - PROBLEM SELECTOR PLAN 1
-Small to moderate right >small left pleural effusion. Indeterminate hypodense lesion along the left chest wall.   - Patient was recently hospitalized as [facility] in [place] for similar finding and had a pleural tap which revealed her to have a malignant R sided effusion, therefore suspect her current effusion is likely a reaccumulation of her malignant effusion  -Pulmonology consult for possible thoracentesis VS pleurx

## 2018-04-27 NOTE — PHYSICAL THERAPY INITIAL EVALUATION ADULT - GAIT DEVIATIONS NOTED, PT EVAL
decreased adair/decreased stride length/increased time in double stance/decreased velocity of limb motion/decreased weight-shifting ability/decreased step length

## 2018-04-27 NOTE — H&P ADULT - NSHPOUTPATIENTPROVIDERS_GEN_ALL_CORE
[facility] (Dr. Ernie Estrella [phone])

## 2018-04-27 NOTE — DISCHARGE NOTE ADULT - PATIENT PORTAL LINK FT
You can access the [facility] Patient Portal, offered by [facility], by registering with the following website: [email or web address]

## 2018-04-27 NOTE — DISCHARGE NOTE ADULT - MEDICATION SUMMARY - MEDICATIONS TO STOP TAKING
I will STOP taking the medications listed below when I get home from the hospital:    Lasix 20 mg oral tablet  -- 1 tab(s) by mouth once a day I will STOP taking the medications listed below when I get home from the hospital:  None

## 2018-04-27 NOTE — H&P ADULT - NSHPREVIEWOFSYSTEMS_GEN_ALL_CORE
REVIEW OF SYSTEMS:  CONSTITUTIONAL: No weakness, fevers or chills  EYES/ENT: No visual changes;  No vertigo or throat pain   NECK: No pain or stiffness  RESPIRATORY: +shortness of breath. No cough  CARDIOVASCULAR: No chest pain or palpitations  GASTROINTESTINAL: No abdominal or epigastric pain. No nausea, vomiting, or hematemesis; No diarrhea or constipation. No melena or hematochezia.  GENITOURINARY: No dysuria, frequency or hematuria  NEUROLOGICAL: No numbness or weakness  SKIN: No itching, burning, rashes, or lesions   All other review of systems is negative unless indicated above. REVIEW OF SYSTEMS:  CONSTITUTIONAL: No weakness, fevers or chills  EYES/ENT: No visual changes or discharge;  No vertigo or throat pain   NECK: No pain or stiffness  RESPIRATORY: +shortness of breath. No cough  CARDIOVASCULAR: No chest pain or palpitations  GASTROINTESTINAL: No abdominal or epigastric pain. No nausea, vomiting, or hematemesis; No diarrhea or constipation. No melena or hematochezia.  GENITOURINARY: No dysuria, frequency or hematuria  NEUROLOGICAL: No numbness or weakness  MSK: No joint pain or swelling  SKIN: No itching, burning, rashes, or lesions   All other review of systems is negative unless indicated above.

## 2018-04-27 NOTE — PHYSICAL THERAPY INITIAL EVALUATION ADULT - PATIENT PROFILE REVIEW, REHAB EVAL
PT orders received-->no formal activity order. Consult with SHABANA TAMAYO-->pt OK to participate in PT evaluation and ambulate with PT./yes

## 2018-04-28 VITALS
OXYGEN SATURATION: 96 % | TEMPERATURE: 98 F | HEART RATE: 111 BPM | RESPIRATION RATE: 18 BRPM | DIASTOLIC BLOOD PRESSURE: 76 MMHG | SYSTOLIC BLOOD PRESSURE: 127 MMHG

## 2018-04-28 PROCEDURE — 99239 HOSP IP/OBS DSCHRG MGMT >30: CPT

## 2018-04-28 RX ORDER — FUROSEMIDE 40 MG
1 TABLET ORAL
Qty: 0 | Refills: 0 | COMMUNITY

## 2018-04-28 RX ORDER — FUROSEMIDE 40 MG
1 TABLET ORAL
Qty: 0 | Refills: 0 | COMMUNITY
Start: 2018-04-28

## 2018-04-28 RX ORDER — FUROSEMIDE 40 MG
1 TABLET ORAL
Qty: 30 | Refills: 0 | OUTPATIENT
Start: 2018-04-28 | End: 2018-05-27

## 2018-04-28 RX ADMIN — PREGABALIN 100 MICROGRAM(S): 225 CAPSULE ORAL at 11:17

## 2018-04-28 RX ADMIN — Medication 20 MILLIGRAM(S): at 06:10

## 2018-04-28 RX ADMIN — Medication 10 MILLIGRAM(S): at 06:09

## 2018-04-28 RX ADMIN — ZINC SULFATE TAB 220 MG (50 MG ZINC EQUIVALENT) 220 MILLIGRAM(S): 220 (50 ZN) TAB at 11:16

## 2018-04-28 RX ADMIN — Medication 1 MILLIGRAM(S): at 11:16

## 2018-04-28 RX ADMIN — Medication 1 APPLICATION(S): at 06:09

## 2018-04-28 RX ADMIN — FAMOTIDINE 20 MILLIGRAM(S): 10 INJECTION INTRAVENOUS at 11:16

## 2018-04-28 RX ADMIN — Medication 1 TABLET(S): at 11:16

## 2018-04-28 RX ADMIN — Medication 500 MILLIGRAM(S): at 11:16

## 2018-04-28 NOTE — PROGRESS NOTE ADULT - PROBLEM SELECTOR PROBLEM 2
Malignant neoplasm of left lung, unspecified part of lung
Malignant neoplasm of left lung, unspecified part of lung

## 2018-04-28 NOTE — PROVIDER CONTACT NOTE (OTHER) - ACTION/TREATMENT ORDERED:
MD aware. No interventions at this time.
MD aware. No interventions at this time. Continue to monitor.
continue to monitor

## 2018-04-28 NOTE — PROGRESS NOTE ADULT - SUBJECTIVE AND OBJECTIVE BOX
Contact Info:  Seamus Key M.D., PGY-1  Pager: ns- [phone], PBZ- [phone]      Chief Complaint: Patient is a 72y old  Female who presents with a chief complaint of Pleural Effusion (2018 14:12)        INTERVAL HPI/OVERNIGHT EVENTS:   No acute overnight event. Patient reports feeling_____. Denied fever, chill, nausea, vomiting, headache, CP, SOB, abdominal pain, diarrhea, or constipation.       MEDICATIONS  (STANDING):  ascorbic acid 500 milliGRAM(s) Oral daily  clotrimazole 1% Cream 1 Application(s) Topical two times a day  cyanocobalamin 100 MICROGram(s) Oral daily  famotidine    Tablet 20 milliGRAM(s) Oral daily  folic acid 1 milliGRAM(s) Oral daily  furosemide    Tablet 20 milliGRAM(s) Oral daily  multivitamin 1 Tablet(s) Oral daily  predniSONE   Tablet 10 milliGRAM(s) Oral daily  tiotropium 18 MICROgram(s) Capsule 1 Capsule(s) Inhalation daily  zinc sulfate 220 milliGRAM(s) Oral daily    MEDICATIONS  (PRN):  benzonatate 100 milliGRAM(s) Oral three times a day PRN Cough      Vital Signs Last 24 Hrs  T(C): 36.7 (2018 05:33), Max: 36.8 (2018 22:27)  T(F): 98 (2018 05:33), Max: 98.2 (2018 22:27)  HR: 72 (2018 05:33) (72 - 111)  BP: 128/57 (2018 05:33) (108/62 - 128/57)  BP(mean): --  RR: 17 (2018 05:33) (17 - 18)  SpO2: 98% (2018 05:33) (95% - 98%)  Supplemental O2: [ ] No, on Room Air [ ] Yes,     I&O's Detail    CAPILLARY BLOOD GLUCOSE          PHYSICAL EXAM:  Daily     Daily Weight in k (2018 05:33)   Appearance: NAD, well-developed	  HEENT:   Normal oral mucosa, PERRL, EOMI	  Neck: No JVD, no LAD, supple, trachea in midline  Cardiovascular: Normal S1 S2, No JVD, No murmurs  Respiratory: Lungs clear to auscultation, no wheezing, rhonchi  Gastrointestinal:  Soft, Non-tender, nondistended, + BS  Skin: No rashes, No ecchymoses, No cyanosis	  MSK/Extremities: Normal range of motion, 5/5 Muscle strength bilaterally. No clubbing, cyanosis or edema  Vascular: Peripheral pulses palpable 2+ bilaterally  Neurologic: Non-focal, CN II-XII intact  Psychiatry: A & O x 3, Mood & affect appropriate    LABS:                        15.2   5.68  )-----------( 217      ( 2018 17:24 )             46.6         137  |  96<L>  |  12  ----------------------------<  130<H>  4.0   |  29  |  0.50    Ca    9.4      2018 17:24    TPro  7.0  /  Alb  3.2<L>  /  TBili  0.7  /  DBili  x   /  AST  21  /  ALT  20  /  AlkPhos  160<H>      LIVER FUNCTIONS - ( 2018 17:24 )  Alb: 3.2 g/dL / Pro: 7.0 g/dL / ALK PHOS: 160 u/L / ALT: 20 u/L / AST: 21 u/L / GGT: x     / T. Bili 0.7 mg/dL / D. Bili x         PT/INR - ( 2018 17:24 )   PT: 12.2 SEC;   INR: 1.06          PTT - ( 2018 17:24 )  PTT:34.5 SEC      ( 2018 17:24 )CARDIAC MARKERS   CK x       CKMB x       CKMB Units x       Troponin T < 0.06 ng/mL        Imaging Personally Reviewed:  [ ] YES  [ ] NO  Consultant(s) Notes Reviewed:  [X] YES  [ ] NO  Care Discussed with Consultants/Other Providers [ ] YES  [ ] NO Contact Info:  Seamus Key M.D., PGY-1  Pager: ns- [phone], EGG- [phone]      Chief Complaint: Patient is a 72y old  Female who presents with a chief complaint of Pleural Effusion (2018 14:12)        INTERVAL HPI/OVERNIGHT EVENTS:   No acute overnight event. Patient reports feeling good with no SOB. Also denied fever, chill, CP, abdominal pain or diarrhea.       MEDICATIONS  (STANDING):  ascorbic acid 500 milliGRAM(s) Oral daily  clotrimazole 1% Cream 1 Application(s) Topical two times a day  cyanocobalamin 100 MICROGram(s) Oral daily  famotidine    Tablet 20 milliGRAM(s) Oral daily  folic acid 1 milliGRAM(s) Oral daily  furosemide    Tablet 20 milliGRAM(s) Oral daily  multivitamin 1 Tablet(s) Oral daily  predniSONE   Tablet 10 milliGRAM(s) Oral daily  tiotropium 18 MICROgram(s) Capsule 1 Capsule(s) Inhalation daily  zinc sulfate 220 milliGRAM(s) Oral daily    MEDICATIONS  (PRN):  benzonatate 100 milliGRAM(s) Oral three times a day PRN Cough      Vital Signs Last 24 Hrs  T(C): 36.7 (2018 05:33), Max: 36.8 (2018 22:27)  T(F): 98 (2018 05:33), Max: 98.2 (2018 22:27)  HR: 72 (2018 05:33) (72 - 111)  BP: 128/57 (2018 05:33) (108/62 - 128/57)  BP(mean): --  RR: 17 (2018 05:33) (17 - 18)  SpO2: 98% (2018 05:33) (95% - 98%)  Supplemental O2: [ ] No, on Room Air [ ] Yes,     I&O's Detail    CAPILLARY BLOOD GLUCOSE          PHYSICAL EXAM:  Daily     Daily Weight in k (2018 05:33)   General: WN/WD NAD  Neurology: A&Ox3, nonfocal, WHITE x 4  Head:  Normocephalic, atraumatic  ENT:  Mucosa moist, no ulcerations  Neck:  Supple, no sinuses or palpable masses  Lymphatic:  No palpable cervical, supraclavicular, axillary or inguinal adenopathy  Respiratory: decreased BS R>L  CV: RRR, S1S2, no murmur  Abdominal: Soft, NT, ND no palpable mass  MSK: No edema, + peripheral pulses, FROM all 4 extremity    LABS:                        15.2   5.68  )-----------( 217      ( 2018 17:24 )             46.6         137  |  96<L>  |  12  ----------------------------<  130<H>  4.0   |  29  |  0.50    Ca    9.4      2018 17:24    TPro  7.0  /  Alb  3.2<L>  /  TBili  0.7  /  DBili  x   /  AST  21  /  ALT  20  /  AlkPhos  160<H>      LIVER FUNCTIONS - ( 2018 17:24 )  Alb: 3.2 g/dL / Pro: 7.0 g/dL / ALK PHOS: 160 u/L / ALT: 20 u/L / AST: 21 u/L / GGT: x     / T. Bili 0.7 mg/dL / D. Bili x         PT/INR - ( 2018 17:24 )   PT: 12.2 SEC;   INR: 1.06          PTT - ( 2018 17:24 )  PTT:34.5 SEC      ( 2018 17:24 )CARDIAC MARKERS   CK x       CKMB x       CKMB Units x       Troponin T < 0.06 ng/mL        Imaging Personally Reviewed:  [ ] YES  [ ] NO  Consultant(s) Notes Reviewed:  [X] YES  [ ] NO  Care Discussed with Consultants/Other Providers [ ] YES  [ ] NO

## 2018-04-28 NOTE — PROVIDER CONTACT NOTE (OTHER) - BACKGROUND
Pt admitted with pleural effusion

## 2018-04-28 NOTE — PROGRESS NOTE ADULT - ASSESSMENT
Patient is a 72 year-old female with PMH of metastatic small cell lung cancer (dx 2014,  on tagrisso), recurrent left pulmonary effusion referred from [facility] rehabilitation for worsening SOB. Patient is a 72 year-old female with PMH of metastatic small cell lung cancer (dx 2014,  on tagrisso), hx of recurrent pleural effusion referred from [facility] for imaging finding of right pleural effusion.

## 2018-04-28 NOTE — PROGRESS NOTE ADULT - ATTENDING COMMENTS
Pt was seen and examed at bedside with resident.   71 yo F PMH metastatic small cell lung cancer, with recurrent pleural effusion, was admitted from [facility] for imaging finding of right pleural effusion. Pt speakers full sentence without SOB, afebrile, resting comfortable on bed. Pulm consult appreciated. CTA Small to moderate right and small left pleural effusion, no indication for thoracentesis or Pleurx. continue prednisone for sarcoidosis. continue Lasix and bronchodilators.   D/c planning today, pending SW/CM arranging home care if qualified. Discussed with Pt, discharge time 35 min.
Patient seen and examined with team.  Agree with above A/P by Team  Pleural effusion too small for tap- Pulmonary consult appreciated- c/w Lasix 20 mg qd- out patient f/u with MD at [facility] [place].- copy of Ct result given to patient who signed release form.  45 min with coordination of discharge.

## 2018-04-28 NOTE — PROGRESS NOTE ADULT - PROBLEM SELECTOR PLAN 2
-Will need to resume tigrasso  -Monitor calcium and electrolytes  - c/w inhaler therapy (change to tiotropium as incruse not on formulary), lasix, MVI supplementation - C/w home tagrisso  - c/w inhaler therapy (change to tiotropium as incruse not on formulary), lasix, MVI supplementation

## 2018-04-28 NOTE — PROGRESS NOTE ADULT - PROBLEM SELECTOR PLAN 1
-Small to moderate right >small left pleural effusion. Indeterminate hypodense lesion along the left chest wall.   - Patient was recently hospitalized as [facility] in [place] for similar finding and had a pleural tap which revealed her to have a malignant R sided effusion, therefore suspect her current effusion is likely a reaccumulation of her malignant effusion  -Pulmonology consult appreciated. No indication for thoracentesis/pleurx at this time. -Small to moderate right >small left pleural effusion. Indeterminate hypodense lesion along the left chest wall. Effusion likely malignant in setting of lung cancer.   -Pulmonology consult appreciated. No indication for thoracentesis/pleurx at this time.

## 2018-05-01 RX ORDER — NYSTATIN CREAM 100000 [USP'U]/G
1 CREAM TOPICAL
Qty: 15 | Refills: 0 | OUTPATIENT
Start: 2018-05-01 | End: 2018-05-30

## 2018-05-01 RX ORDER — FUROSEMIDE 40 MG
1 TABLET ORAL
Qty: 30 | Refills: 0 | OUTPATIENT
Start: 2018-05-01 | End: 2018-05-30

## 2018-05-01 RX ORDER — NYSTATIN CREAM 100000 [USP'U]/G
1 CREAM TOPICAL
Qty: 15 | Refills: 0 | OUTPATIENT
Start: 2018-05-01

## 2019-12-02 NOTE — ED ADULT NURSE NOTE - NS ED NURSE DISCH DISPOSITION
Discharge Note  Ms. Narayan was seen in outpatient physical therapy for an initial evaluation on 10/1/19 for RUE cording and decreased shoulder mobility s/p right mastectomy secondary to right breast cancer. Ms. Narayan attended five follow up visits. I spoke with Ms. Narayan today, she is no longer having pain in her RUE and is continuing with her HEP and pool therapy. Patient feels she can continue on her own and does not feel she needs further physical therapy. POC has ended and patient is discharged from physical therapy.        Bibiana Emanuel, PT   Admitted
